# Patient Record
Sex: FEMALE | Race: OTHER | Employment: FULL TIME | URBAN - METROPOLITAN AREA
[De-identification: names, ages, dates, MRNs, and addresses within clinical notes are randomized per-mention and may not be internally consistent; named-entity substitution may affect disease eponyms.]

---

## 2020-04-24 ENCOUNTER — APPOINTMENT (OUTPATIENT)
Dept: GENERAL RADIOLOGY | Facility: HOSPITAL | Age: 40
End: 2020-04-24
Attending: EMERGENCY MEDICINE
Payer: OTHER GOVERNMENT

## 2020-04-24 ENCOUNTER — HOSPITAL ENCOUNTER (EMERGENCY)
Facility: HOSPITAL | Age: 40
Discharge: HOME | End: 2020-04-24
Attending: EMERGENCY MEDICINE
Payer: OTHER GOVERNMENT

## 2020-04-24 VITALS
HEART RATE: 85 BPM | BODY MASS INDEX: 27.38 KG/M2 | RESPIRATION RATE: 19 BRPM | SYSTOLIC BLOOD PRESSURE: 133 MMHG | WEIGHT: 145 LBS | OXYGEN SATURATION: 95 % | HEIGHT: 61 IN | DIASTOLIC BLOOD PRESSURE: 87 MMHG | TEMPERATURE: 99 F

## 2020-04-24 DIAGNOSIS — U07.1 COVID-19 VIRUS INFECTION: Primary | ICD-10-CM

## 2020-04-24 PROCEDURE — 99283 EMERGENCY DEPT VISIT LOW MDM: CPT

## 2020-04-24 RX ORDER — ACETAMINOPHEN 500 MG
1000 TABLET ORAL ONCE
Status: COMPLETED | OUTPATIENT
Start: 2020-04-24 | End: 2020-04-24

## 2020-04-24 NOTE — ED NOTES
Patients ride arrived. Discharge instructions given to patient. Patient had no further questions. Patient verbalized understanding. Patient walked to exit with RN.

## 2020-04-24 NOTE — ED NOTES
Patient here with cough and headaches. Mask on patient upon arrival.   Lung sounds are clear throughout. No visible distress or significant work of breathing. Primarily Maced[place] speaking.  used.

## 2020-04-24 NOTE — ED NOTES
Per MD patient okay to be DC'd. Called Case management to verify that we were unable to order public transportation for patient. Patient instructed to try to find a ride, RN able to facilitate solution with patient through use of a .

## 2020-04-24 NOTE — ED INITIAL ASSESSMENT (HPI)
Patient here for cough and headache. x1 week. Subjective fevers, and pain in her chest when she is coughing. Slightly tachypnic, but does not appear to be struggling to breath. 98% on RA. Known COVID contacts.    Last tylenol at 9pm

## 2020-04-24 NOTE — CM/SW NOTE
Called by Adi Marvin RN to assist with discharge transportation for patient home whom is COVID +. Informed MEDINA James patient unable to take lyft or public transportation and will need to call family member or take BLS home.   Per Adi Marvin patient was able t

## 2020-04-25 NOTE — ED PROVIDER NOTES
Patient Seen in: [facility] AND [facility] Emergency Department    History   Patient presents with:  Cough/URI  Headache      HPI    Patient presents to the ED complaining of a cough and headache for the past 1 week. Occasional subjective fevers.   Complains of the exam.  Stethoscope and any equipment used during my examination was cleaned with super sani-cloth germicidal wipes following the exam.     Physical Exam   Constitutional: She is oriented to person, place, and time.  She appears well-developed and well-n problem list on file. to contribute to the complexity of this ED evaluation. ED Course: Patient presents to the ED with COVID-19 infection symptoms. No distress on exam, no respiratory distress and normal O2 saturations. Testing positive.   Patient kortney

## 2021-03-10 ENCOUNTER — HOSPITAL ENCOUNTER (EMERGENCY)
Facility: HOSPITAL | Age: 41
Discharge: HOME | End: 2021-03-10

## 2021-03-10 ENCOUNTER — APPOINTMENT (OUTPATIENT)
Dept: ULTRASOUND IMAGING | Facility: HOSPITAL | Age: 41
End: 2021-03-10
Attending: NURSE PRACTITIONER

## 2021-03-10 ENCOUNTER — APPOINTMENT (OUTPATIENT)
Dept: CT IMAGING | Facility: HOSPITAL | Age: 41
End: 2021-03-10
Attending: NURSE PRACTITIONER

## 2021-03-10 VITALS
HEART RATE: 86 BPM | DIASTOLIC BLOOD PRESSURE: 63 MMHG | TEMPERATURE: 99 F | RESPIRATION RATE: 18 BRPM | SYSTOLIC BLOOD PRESSURE: 120 MMHG | OXYGEN SATURATION: 99 %

## 2021-03-10 DIAGNOSIS — R10.13 EPIGASTRIC PAIN: Primary | ICD-10-CM

## 2021-03-10 LAB
ALBUMIN SERPL-MCNC: 4 G/DL (ref 3.4–5)
ALBUMIN/GLOB SERPL: 1.1 {RATIO} (ref 1–2)
ALP LIVER SERPL-CCNC: 119 U/L
ALT SERPL-CCNC: 44 U/L
ANION GAP SERPL CALC-SCNC: 5 MMOL/L (ref 0–18)
AST SERPL-CCNC: 19 U/L (ref 15–37)
B-HCG UR QL: NEGATIVE
BASOPHILS # BLD AUTO: 0.04 X10(3) UL (ref 0–0.2)
BASOPHILS NFR BLD AUTO: 0.4 %
BILIRUB SERPL-MCNC: 0.4 MG/DL (ref 0.1–2)
BILIRUB UR QL: NEGATIVE
BUN BLD-MCNC: 13 MG/DL (ref 7–18)
BUN/CREAT SERPL: 17.3 (ref 10–20)
CALCIUM BLD-MCNC: 9.3 MG/DL (ref 8.5–10.1)
CHLORIDE SERPL-SCNC: 106 MMOL/L (ref 98–112)
CLARITY UR: CLEAR
CO2 SERPL-SCNC: 29 MMOL/L (ref 21–32)
COLOR UR: YELLOW
CREAT BLD-MCNC: 0.75 MG/DL
DEPRECATED RDW RBC AUTO: 38.5 FL (ref 35.1–46.3)
EOSINOPHIL # BLD AUTO: 0.11 X10(3) UL (ref 0–0.7)
EOSINOPHIL NFR BLD AUTO: 1.1 %
ERYTHROCYTE [DISTWIDTH] IN BLOOD BY AUTOMATED COUNT: 11.7 % (ref 11–15)
GLOBULIN PLAS-MCNC: 3.7 G/DL (ref 2.8–4.4)
GLUCOSE BLD-MCNC: 107 MG/DL (ref 70–99)
GLUCOSE UR-MCNC: NEGATIVE MG/DL
HCT VFR BLD AUTO: 37.9 %
HGB BLD-MCNC: 12.7 G/DL
IMM GRANULOCYTES # BLD AUTO: 0.03 X10(3) UL (ref 0–1)
IMM GRANULOCYTES NFR BLD: 0.3 %
KETONES UR-MCNC: NEGATIVE MG/DL
LEUKOCYTE ESTERASE UR QL STRIP.AUTO: NEGATIVE
LIPASE SERPL-CCNC: 130 U/L (ref 73–393)
LYMPHOCYTES # BLD AUTO: 3.12 X10(3) UL (ref 1–4)
LYMPHOCYTES NFR BLD AUTO: 30.4 %
M PROTEIN MFR SERPL ELPH: 7.7 G/DL (ref 6.4–8.2)
MCH RBC QN AUTO: 30.2 PG (ref 26–34)
MCHC RBC AUTO-ENTMCNC: 33.5 G/DL (ref 31–37)
MCV RBC AUTO: 90.2 FL
MONOCYTES # BLD AUTO: 0.61 X10(3) UL (ref 0.1–1)
MONOCYTES NFR BLD AUTO: 5.9 %
NEUTROPHILS # BLD AUTO: 6.36 X10 (3) UL (ref 1.5–7.7)
NEUTROPHILS # BLD AUTO: 6.36 X10(3) UL (ref 1.5–7.7)
NEUTROPHILS NFR BLD AUTO: 61.9 %
NITRITE UR QL STRIP.AUTO: NEGATIVE
OSMOLALITY SERPL CALC.SUM OF ELEC: 291 MOSM/KG (ref 275–295)
PH UR: 6 [PH] (ref 5–8)
PLATELET # BLD AUTO: 346 10(3)UL (ref 150–450)
POTASSIUM SERPL-SCNC: 3.7 MMOL/L (ref 3.5–5.1)
PROT UR-MCNC: NEGATIVE MG/DL
RBC # BLD AUTO: 4.2 X10(6)UL
SODIUM SERPL-SCNC: 140 MMOL/L (ref 136–145)
SP GR UR STRIP: 1.02 (ref 1–1.03)
UROBILINOGEN UR STRIP-ACNC: <2
WBC # BLD AUTO: 10.3 X10(3) UL (ref 4–11)

## 2021-03-10 PROCEDURE — 96374 THER/PROPH/DIAG INJ IV PUSH: CPT

## 2021-03-10 PROCEDURE — 96375 TX/PRO/DX INJ NEW DRUG ADDON: CPT

## 2021-03-10 PROCEDURE — 81001 URINALYSIS AUTO W/SCOPE: CPT | Performed by: NURSE PRACTITIONER

## 2021-03-10 PROCEDURE — 96361 HYDRATE IV INFUSION ADD-ON: CPT

## 2021-03-10 PROCEDURE — S0028 INJECTION, FAMOTIDINE, 20 MG: HCPCS | Performed by: NURSE PRACTITIONER

## 2021-03-10 PROCEDURE — 80053 COMPREHEN METABOLIC PANEL: CPT | Performed by: NURSE PRACTITIONER

## 2021-03-10 PROCEDURE — 74176 CT ABD & PELVIS W/O CONTRAST: CPT | Performed by: NURSE PRACTITIONER

## 2021-03-10 PROCEDURE — 76705 ECHO EXAM OF ABDOMEN: CPT | Performed by: NURSE PRACTITIONER

## 2021-03-10 PROCEDURE — 83690 ASSAY OF LIPASE: CPT | Performed by: NURSE PRACTITIONER

## 2021-03-10 PROCEDURE — 96376 TX/PRO/DX INJ SAME DRUG ADON: CPT

## 2021-03-10 PROCEDURE — 81025 URINE PREGNANCY TEST: CPT

## 2021-03-10 PROCEDURE — 99284 EMERGENCY DEPT VISIT MOD MDM: CPT

## 2021-03-10 PROCEDURE — 85025 COMPLETE CBC W/AUTO DIFF WBC: CPT | Performed by: NURSE PRACTITIONER

## 2021-03-10 RX ORDER — ONDANSETRON 2 MG/ML
4 INJECTION INTRAMUSCULAR; INTRAVENOUS ONCE
Status: COMPLETED | OUTPATIENT
Start: 2021-03-10 | End: 2021-03-10

## 2021-03-10 RX ORDER — FAMOTIDINE 10 MG/ML
20 INJECTION, SOLUTION INTRAVENOUS ONCE
Status: COMPLETED | OUTPATIENT
Start: 2021-03-10 | End: 2021-03-10

## 2021-03-10 RX ORDER — MORPHINE SULFATE 4 MG/ML
4 INJECTION, SOLUTION INTRAMUSCULAR; INTRAVENOUS ONCE
Status: COMPLETED | OUTPATIENT
Start: 2021-03-10 | End: 2021-03-10

## 2021-03-10 RX ORDER — TRAMADOL HYDROCHLORIDE 50 MG/1
TABLET ORAL EVERY 6 HOURS PRN
Qty: 10 TABLET | Refills: 0 | Status: SHIPPED | OUTPATIENT
Start: 2021-03-10 | End: 2021-03-17

## 2021-03-10 NOTE — ED PROVIDER NOTES
Patient Seen in: [facility] AND [facility] Emergency Department      History   Patient presents with:  Abdomen/Flank Pain    Stated Complaint: abdomianl pain     HPI/Subjective:   39yo/f with hx of no chronic medical problems presents from the [facility] tenderness in the right upper quadrant. Positive signs include Hoffman's sign. Musculoskeletal:         General: No tenderness or deformity. Normal range of motion. Cervical back: Normal range of motion and neck supple.    Skin:     General: Skin is w Automated exposure control for dose reduction was used. Adjustment of the mA and/or kV was done based on the   patient's size.  Iterative reconstruction technique for dose reduction was employed.       FINDINGS:   COMMENT: Evaluation of the vasculature and INDICATIONS: abdominal pain       TECHNIQUE:   The gallbladder was evaluated with grayscale ultrasound.         FINDINGS:   GALLBLADDER:   No dilation.  No calculi, wall thickening or pericholecystic fluid.  Sonographic Hoffman's sign was not elicited. List    START taking these medications    Esomeprazole Magnesium 20 MG Oral Capsule Delayed Release  Take 1 capsule (20 mg total) by mouth daily.   Qty: 30 capsule Refills: 0    traMADol HCl 50 MG Oral Tab  Take 1-2 tablets ( mg total) by mouth every